# Patient Record
(demographics unavailable — no encounter records)

---

## 2025-03-22 NOTE — HISTORY OF PRESENT ILLNESS
[de-identified] : 90-year-old female here for an evaluation of fracture to the left wrist, these injury happened on March 17, 2025, she sustained a mechanical fall injuring her wrist, patient was taken to the emergency room, at the emergency room, her fracture was reduced, she was splinted and advised to follow-up with orthopedic. Patient is right-hand dominant.

## 2025-03-22 NOTE — DISCUSSION/SUMMARY
[de-identified] : IMPRESSION: 1 week status post displaced, intra-articular, comminuted fracture to the distal radius   PLAN: Patient was advised that this fracture is unstable and in a younger individual we we will offer a surgical treatment, but considering the level of activities and the patient's age conservative treatment may be the best option.  patient sounds states that if surgical intervention is indicated for the patient, he will amendable to the plan.  At this time, I advised for a short arm cast, patient agrees. Patient was placed in a short arm cast and a Repeat post casting x-ray was done, there is no change in the alignment of the fracture.  There is still a displaced intra-articular comminuted fracture to the distal radius.   FOLLOW-UP: 1 week with Dr. Díaz.

## 2025-03-22 NOTE — IMAGING
[de-identified] : On examination of the left wrist, patient is a knee sugar-tong splint. Patient appears to be neurovascularly intact to the left upper extremity. Significant swelling over the digits and hand. Significant tenderness over the fracture site.  Radiographs of the left wrist were done with the original splint, these x-rays show increased posterior angulation of the fracture when compared to the post reduction x-rays taken on [facility]

## 2025-04-01 NOTE — HISTORY OF PRESENT ILLNESS
[de-identified] : 90-year-old female left-sided distal radius fracture.  Comes in today for evaluation.  She is in a cast.  She has swelling stiffness and pain

## 2025-04-01 NOTE — PHYSICAL EXAM
[de-identified] : Patient is in a short arm cast.  She has significant swelling dorsal dorsal aspect of the hand.  Limited motion of the fingers.  Normal capillary refill.

## 2025-04-01 NOTE — ASSESSMENT
[FreeTextEntry1] : Patient is a left-sided distal radius fracture.  The amount of swelling and she has across the dorsal aspect of the hand was concerning so I took the cast off placed into a well-padded short arm plaster splint.  She will see us back in 2 weeks with evaluation and radiograph.  If she is comfortable in the splint and it still fitting well that could be the definitive treatment no cast is definitely necessary

## 2025-04-01 NOTE — DATA REVIEWED
[FreeTextEntry1] : Radiographs 3 views of the left wrist are reviewed showing a dorsally displaced distal radius fracture.  Extra-articular

## 2025-04-23 NOTE — HISTORY OF PRESENT ILLNESS
[de-identified] : Patient is a 90-year-old female here for repeat evaluation of her left wrist.  She is about a month status post a dorsally displaced distal radius fracture being treated nonoperatively.  She does not speak English.  Her family is translating.  They state that she is doing well.  She is in a splint.

## 2025-04-23 NOTE — PHYSICAL EXAM
[de-identified] : Physical exam of her left wrist: She is in a well-fitted and molded short arm splint with a volar and dorsal slab of plaster.  She has some swelling of the digits.  She has brisk capillary refill.  Good motion of the digits.  And sensation of the digits is intact.

## 2025-04-23 NOTE — DATA REVIEWED
[FreeTextEntry1] :  3 x-rays taken in the office today for left wrist show a healing dorsally displaced distal radius fracture without any further displacement since the previous x-ray.

## 2025-04-23 NOTE — DISCUSSION/SUMMARY
[de-identified] :   I discussed the x-rays with the patient's family.  She will continue the splint for another 2 weeks.  I will see her back at that point went off, repeat x-ray and evaluation.  Will transition her to a cock up wrist brace at that time.  All questions were answered today.  They will contact the office if they have any questions or concerns prior to that appointment. for